# Patient Record
Sex: MALE | Race: WHITE | NOT HISPANIC OR LATINO | Employment: FULL TIME | URBAN - METROPOLITAN AREA
[De-identification: names, ages, dates, MRNs, and addresses within clinical notes are randomized per-mention and may not be internally consistent; named-entity substitution may affect disease eponyms.]

---

## 2019-03-11 ENCOUNTER — OFFICE VISIT (OUTPATIENT)
Dept: URGENT CARE | Facility: CLINIC | Age: 24
End: 2019-03-11
Payer: MEDICAID

## 2019-03-11 VITALS
BODY MASS INDEX: 25.77 KG/M2 | HEIGHT: 70 IN | TEMPERATURE: 98 F | OXYGEN SATURATION: 98 % | SYSTOLIC BLOOD PRESSURE: 123 MMHG | DIASTOLIC BLOOD PRESSURE: 70 MMHG | RESPIRATION RATE: 16 BRPM | WEIGHT: 180 LBS | HEART RATE: 72 BPM

## 2019-03-11 DIAGNOSIS — S91.131A PUNCTURE WOUND OF GREAT TOE OF RIGHT FOOT, INITIAL ENCOUNTER: Primary | ICD-10-CM

## 2019-03-11 PROCEDURE — 99203 OFFICE O/P NEW LOW 30 MIN: CPT | Mod: S$GLB,,, | Performed by: FAMILY MEDICINE

## 2019-03-11 PROCEDURE — 99203 PR OFFICE/OUTPT VISIT, NEW, LEVL III, 30-44 MIN: ICD-10-PCS | Mod: S$GLB,,, | Performed by: FAMILY MEDICINE

## 2019-03-11 RX ORDER — CIPROFLOXACIN 250 MG/1
250 TABLET, FILM COATED ORAL 2 TIMES DAILY
Qty: 6 TABLET | Refills: 0 | Status: SHIPPED | OUTPATIENT
Start: 2019-03-11 | End: 2019-03-14

## 2019-03-11 NOTE — PATIENT INSTRUCTIONS
Puncture Wound: Foot       A puncture wound occurs when a pointed object (such as a nail) pushes into the skin. It may go into the tissues below the skin of the foot, including fat and muscle. This type of wound is narrow and deep. They can be hard to clean. Puncture wounds are at high risk for becoming infected. One type of serious infection is more likely if you were wearing a rubber-soled shoe at the time of injury. Bacteria from the sole of the shoe may be dragged into the wound. Symptoms of infection may appear as late as 2 to 3 weeks after the injury. Be sure to watch for symptoms of infection and call your healthcare provider right away if any them appear.  X-rays may be done to see whether any objects remain under the skin. Your may also need a tetanus shot. This is given if you are not up to date on this vaccination and the object that caused the wound may lead to tetanus.  Puncture wounds can easily become infected.   Home care  · When you sit or lie down, raise the foot above the level of your heart. This helps reduce swelling and pain.  · Do not put weight on the injured foot if it hurts to do so or if you were told to keep weight off the injury.  · Your healthcare provider may prescribe an antibiotic. This is to help prevent infection. Follow all instructions for taking this medicine. Take the medicine every day until it is gone or you are told to stop. You should not have any left over.  · The healthcare provider may prescribe medicines for pain. Follow instructions for taking them.  · You can take acetaminophen or ibuprofen for pain, unless you were given a different pain medicine to use.   · Follow the healthcare providers instructions on how to care for the wound.  · Keep the wound clean and dry. Do not get the wound wet until you are told it is okay to do so. If the area gets wet, gently pat it dry with a clean cloth. Replace the wet bandage with a dry one.  · If a bandage was applied and it  becomes wet or dirty, replace it. Otherwise, leave it in place for the first 24 hours.  · Once you can get the wound wet, you may shower as usual but do not soak the wound in water (no tub baths or swimming)  · Check the wound daily for symptoms of infection. These include:  ¨ Increasing redness or swelling around the wound  ¨ Increased warmth of the wound  ¨ Worsening pain  ¨ Red streaking lines away from the wound  ¨ Draining pus  Follow-up care  Follow up with your healthcare provider as advised.   When to seek medical advice  Call your healthcare provider right away if any of these occur:  · Any symptoms of infection (listed above)  · Fever of 100.4°F (38.ºC) or higher, or as directed by your healthcare provider  · Wound changes colors  · Numbness around the wound  · Decreased movement around the injured area  Date Last Reviewed: 8/24/2015  © 8486-4320 [employer]. [address] [place] [address], [place] [address]. All rights reserved. This information is not intended as a substitute for professional medical care. Always follow your healthcare professional's instructions.

## 2019-03-11 NOTE — PROGRESS NOTES
"Subjective:       Patient ID: Emeka Mendoza is a 23 y.o. male.    Vitals:  height is 5' 10" (1.778 m) and weight is 81.6 kg (180 lb). His temperature is 98 °F (36.7 °C). His blood pressure is 123/70 and his pulse is 72. His respiration is 16 and oxygen saturation is 98%.     Chief Complaint: Foot Injury (right foot)    Right foot great toe puncture wound from nail 3 pm today , unsure of tetanus.       Foot Injury    The incident occurred 1 to 3 hours ago. There was no injury mechanism. Pertinent negatives include no loss of sensation, numbness or tingling.       Constitution: Negative for chills and fever.   HENT: Negative for facial swelling and sore throat.    Neck: Negative for painful lymph nodes.   Eyes: Negative for eye itching and eyelid swelling.   Respiratory: Negative for cough.    Musculoskeletal: Positive for pain and pain with walking. Negative for joint pain and joint swelling.   Skin: Negative for color change, pale, rash, wound, abrasion, laceration, lesion, skin thickening/induration, puncture wound, erythema, abscess, avulsion and hives.   Allergic/Immunologic: Negative for environmental allergies, immunocompromised state and hives.   Neurological: Negative for numbness.   Hematologic/Lymphatic: Negative for swollen lymph nodes.       Objective:      Physical Exam   Constitutional: He is oriented to person, place, and time. He appears well-developed and well-nourished. He is cooperative.  Non-toxic appearance. He does not appear ill. No distress.   HENT:   Head: Normocephalic and atraumatic.   Right Ear: Hearing, tympanic membrane, external ear and ear canal normal.   Left Ear: Hearing, tympanic membrane, external ear and ear canal normal.   Nose: Nose normal. No mucosal edema, rhinorrhea or nasal deformity. No epistaxis. Right sinus exhibits no maxillary sinus tenderness and no frontal sinus tenderness. Left sinus exhibits no maxillary sinus tenderness and no frontal sinus tenderness. "   Mouth/Throat: Uvula is midline, oropharynx is clear and moist and mucous membranes are normal. No trismus in the jaw. Normal dentition. No uvula swelling. No posterior oropharyngeal erythema.   Eyes: Conjunctivae and lids are normal. Right eye exhibits no discharge. Left eye exhibits no discharge. No scleral icterus.   Sclera clear bilat   Neck: Trachea normal, normal range of motion, full passive range of motion without pain and phonation normal. Neck supple.   Cardiovascular: Normal rate, regular rhythm, normal heart sounds, intact distal pulses and normal pulses.   Pulmonary/Chest: Effort normal and breath sounds normal. No respiratory distress.   Abdominal: Soft. Normal appearance and bowel sounds are normal. He exhibits no distension, no pulsatile midline mass and no mass. There is no tenderness.   Musculoskeletal: Normal range of motion. He exhibits no edema or deformity.   Neurological: He is alert and oriented to person, place, and time. He exhibits normal muscle tone. Coordination normal.   Skin: Skin is warm, dry and intact. He is not diaphoretic. No erythema. No pallor.   Small puncture wound 1 no surrounding erythema induration or evidence of foreign body noted at the plantar base of the right great toe.  This is minimally tender to palpation no palpitation tenderness along the dorsum or sides of the toe.  Normal range of motion without increased pain.   Psychiatric: He has a normal mood and affect. His speech is normal and behavior is normal. Judgment and thought content normal. Cognition and memory are normal.   Nursing note and vitals reviewed.      Assessment:       1. Puncture wound of great toe of right foot, initial encounter        Plan:         Puncture wound of great toe of right foot, initial encounter    Other orders  -     ciprofloxacin HCl (CIPRO) 250 MG tablet; Take 1 tablet (250 mg total) by mouth 2 (two) times daily. for 3 days  Dispense: 6 tablet; Refill: 0     Tetanus prophylaxis is  current based on college attendance records.

## 2023-11-08 ENCOUNTER — OFFICE VISIT (OUTPATIENT)
Dept: INTERNAL MEDICINE | Facility: CLINIC | Age: 28
End: 2023-11-08
Payer: COMMERCIAL

## 2023-11-08 ENCOUNTER — LAB VISIT (OUTPATIENT)
Dept: LAB | Facility: HOSPITAL | Age: 28
End: 2023-11-08
Attending: FAMILY MEDICINE
Payer: COMMERCIAL

## 2023-11-08 VITALS
OXYGEN SATURATION: 100 % | SYSTOLIC BLOOD PRESSURE: 110 MMHG | HEART RATE: 66 BPM | WEIGHT: 186.31 LBS | TEMPERATURE: 97 F | BODY MASS INDEX: 26.67 KG/M2 | HEIGHT: 70 IN | RESPIRATION RATE: 20 BRPM | DIASTOLIC BLOOD PRESSURE: 70 MMHG

## 2023-11-08 DIAGNOSIS — Z83.719 FAMILY HISTORY OF COLON POLYPS, UNSPECIFIED: ICD-10-CM

## 2023-11-08 DIAGNOSIS — Z00.00 ROUTINE ADULT HEALTH MAINTENANCE: ICD-10-CM

## 2023-11-08 DIAGNOSIS — Z00.00 ROUTINE ADULT HEALTH MAINTENANCE: Primary | ICD-10-CM

## 2023-11-08 LAB
ALBUMIN SERPL BCP-MCNC: 4.4 G/DL (ref 3.5–5.2)
ALP SERPL-CCNC: 73 U/L (ref 55–135)
ALT SERPL W/O P-5'-P-CCNC: 12 U/L (ref 10–44)
ANION GAP SERPL CALC-SCNC: 10 MMOL/L (ref 8–16)
AST SERPL-CCNC: 18 U/L (ref 10–40)
BASOPHILS # BLD AUTO: 0.06 K/UL (ref 0–0.2)
BASOPHILS NFR BLD: 1.7 % (ref 0–1.9)
BILIRUB SERPL-MCNC: 1.3 MG/DL (ref 0.1–1)
BUN SERPL-MCNC: 12 MG/DL (ref 6–20)
CALCIUM SERPL-MCNC: 9.5 MG/DL (ref 8.7–10.5)
CHLORIDE SERPL-SCNC: 107 MMOL/L (ref 95–110)
CHOLEST SERPL-MCNC: 199 MG/DL (ref 120–199)
CHOLEST/HDLC SERPL: 4.7 {RATIO} (ref 2–5)
CO2 SERPL-SCNC: 24 MMOL/L (ref 23–29)
CREAT SERPL-MCNC: 1 MG/DL (ref 0.5–1.4)
DIFFERENTIAL METHOD: ABNORMAL
EOSINOPHIL # BLD AUTO: 0.1 K/UL (ref 0–0.5)
EOSINOPHIL NFR BLD: 1.7 % (ref 0–8)
ERYTHROCYTE [DISTWIDTH] IN BLOOD BY AUTOMATED COUNT: 11.6 % (ref 11.5–14.5)
EST. GFR  (NO RACE VARIABLE): >60 ML/MIN/1.73 M^2
GLUCOSE SERPL-MCNC: 114 MG/DL (ref 70–110)
HCT VFR BLD AUTO: 39 % (ref 40–54)
HDLC SERPL-MCNC: 42 MG/DL (ref 40–75)
HDLC SERPL: 21.1 % (ref 20–50)
HGB BLD-MCNC: 13.6 G/DL (ref 14–18)
IMM GRANULOCYTES # BLD AUTO: 0.01 K/UL (ref 0–0.04)
IMM GRANULOCYTES NFR BLD AUTO: 0.3 % (ref 0–0.5)
LDLC SERPL CALC-MCNC: 142 MG/DL (ref 63–159)
LYMPHOCYTES # BLD AUTO: 1.5 K/UL (ref 1–4.8)
LYMPHOCYTES NFR BLD: 40.8 % (ref 18–48)
MCH RBC QN AUTO: 30.8 PG (ref 27–31)
MCHC RBC AUTO-ENTMCNC: 34.9 G/DL (ref 32–36)
MCV RBC AUTO: 88 FL (ref 82–98)
MONOCYTES # BLD AUTO: 0.4 K/UL (ref 0.3–1)
MONOCYTES NFR BLD: 11.1 % (ref 4–15)
NEUTROPHILS # BLD AUTO: 1.6 K/UL (ref 1.8–7.7)
NEUTROPHILS NFR BLD: 44.4 % (ref 38–73)
NONHDLC SERPL-MCNC: 157 MG/DL
NRBC BLD-RTO: 0 /100 WBC
PLATELET # BLD AUTO: 268 K/UL (ref 150–450)
PMV BLD AUTO: 11 FL (ref 9.2–12.9)
POTASSIUM SERPL-SCNC: 4.1 MMOL/L (ref 3.5–5.1)
PROT SERPL-MCNC: 7.2 G/DL (ref 6–8.4)
RBC # BLD AUTO: 4.42 M/UL (ref 4.6–6.2)
SODIUM SERPL-SCNC: 141 MMOL/L (ref 136–145)
TRIGL SERPL-MCNC: 75 MG/DL (ref 30–150)
WBC # BLD AUTO: 3.6 K/UL (ref 3.9–12.7)

## 2023-11-08 PROCEDURE — 90715 TDAP VACCINE GREATER THAN OR EQUAL TO 7YO IM: ICD-10-PCS | Mod: S$GLB,,, | Performed by: FAMILY MEDICINE

## 2023-11-08 PROCEDURE — 99999 PR PBB SHADOW E&M-NEW PATIENT-LVL III: ICD-10-PCS | Mod: PBBFAC,,, | Performed by: FAMILY MEDICINE

## 2023-11-08 PROCEDURE — 99385 PR PREVENTIVE VISIT,NEW,18-39: ICD-10-PCS | Mod: 25,S$GLB,, | Performed by: FAMILY MEDICINE

## 2023-11-08 PROCEDURE — 99999 PR PBB SHADOW E&M-NEW PATIENT-LVL III: CPT | Mod: PBBFAC,,, | Performed by: FAMILY MEDICINE

## 2023-11-08 PROCEDURE — 36415 COLL VENOUS BLD VENIPUNCTURE: CPT | Mod: PO | Performed by: FAMILY MEDICINE

## 2023-11-08 PROCEDURE — 90715 TDAP VACCINE 7 YRS/> IM: CPT | Mod: S$GLB,,, | Performed by: FAMILY MEDICINE

## 2023-11-08 PROCEDURE — 3008F PR BODY MASS INDEX (BMI) DOCUMENTED: ICD-10-PCS | Mod: CPTII,S$GLB,, | Performed by: FAMILY MEDICINE

## 2023-11-08 PROCEDURE — 99385 PREV VISIT NEW AGE 18-39: CPT | Mod: 25,S$GLB,, | Performed by: FAMILY MEDICINE

## 2023-11-08 PROCEDURE — 3074F SYST BP LT 130 MM HG: CPT | Mod: CPTII,S$GLB,, | Performed by: FAMILY MEDICINE

## 2023-11-08 PROCEDURE — 80061 LIPID PANEL: CPT | Performed by: FAMILY MEDICINE

## 2023-11-08 PROCEDURE — 3078F PR MOST RECENT DIASTOLIC BLOOD PRESSURE < 80 MM HG: ICD-10-PCS | Mod: CPTII,S$GLB,, | Performed by: FAMILY MEDICINE

## 2023-11-08 PROCEDURE — 85025 COMPLETE CBC W/AUTO DIFF WBC: CPT | Performed by: FAMILY MEDICINE

## 2023-11-08 PROCEDURE — 3008F BODY MASS INDEX DOCD: CPT | Mod: CPTII,S$GLB,, | Performed by: FAMILY MEDICINE

## 2023-11-08 PROCEDURE — 1159F PR MEDICATION LIST DOCUMENTED IN MEDICAL RECORD: ICD-10-PCS | Mod: CPTII,S$GLB,, | Performed by: FAMILY MEDICINE

## 2023-11-08 PROCEDURE — 3078F DIAST BP <80 MM HG: CPT | Mod: CPTII,S$GLB,, | Performed by: FAMILY MEDICINE

## 2023-11-08 PROCEDURE — 90471 TDAP VACCINE GREATER THAN OR EQUAL TO 7YO IM: ICD-10-PCS | Mod: S$GLB,,, | Performed by: FAMILY MEDICINE

## 2023-11-08 PROCEDURE — 3074F PR MOST RECENT SYSTOLIC BLOOD PRESSURE < 130 MM HG: ICD-10-PCS | Mod: CPTII,S$GLB,, | Performed by: FAMILY MEDICINE

## 2023-11-08 PROCEDURE — 80053 COMPREHEN METABOLIC PANEL: CPT | Performed by: FAMILY MEDICINE

## 2023-11-08 PROCEDURE — 90471 IMMUNIZATION ADMIN: CPT | Mod: S$GLB,,, | Performed by: FAMILY MEDICINE

## 2023-11-08 PROCEDURE — 1159F MED LIST DOCD IN RCRD: CPT | Mod: CPTII,S$GLB,, | Performed by: FAMILY MEDICINE

## 2023-11-08 NOTE — PROGRESS NOTES
"  Subjective:      Patient ID: Emeka Mendoza is a 27 y.o. male.    Chief Complaint: No chief complaint on file.      Patient is here for routine annual wellness visit and to establish care.  He is generally in good health, takes no routine medications.  He reports multiple family members with colon polyps found in 30s and 40s, reports parent had to have surgery to remove one.  Requesting tetanus vaccine, he is a  and frequently cuts himself      Review of Systems   Constitutional:  Negative for activity change, appetite change and unexpected weight change.   HENT:  Negative for congestion.    Respiratory:  Negative for shortness of breath.    Cardiovascular:  Negative for leg swelling.   Gastrointestinal:  Negative for abdominal pain.   Musculoskeletal:  Negative for arthralgias.   Skin:  Negative for wound.   Psychiatric/Behavioral:  Negative for sleep disturbance.      Past Medical History:   Diagnosis Date    Anxiety           Past Surgical History:   Procedure Laterality Date    FRACTURE SURGERY       Family History   Problem Relation Age of Onset    No Known Problems Mother     No Known Problems Father      Social History     Socioeconomic History    Marital status: Single   Tobacco Use    Smoking status: Never    Smokeless tobacco: Never     Review of patient's allergies indicates:  No Known Allergies    Objective:       /70 (BP Location: Left arm, Patient Position: Sitting, BP Method: Large (Manual))   Pulse 66   Temp 96.9 °F (36.1 °C) (Tympanic)   Resp 20   Ht 5' 10.25" (1.784 m)   Wt 84.5 kg (186 lb 4.6 oz)   SpO2 100%   BMI 26.54 kg/m²   Physical Exam  Vitals reviewed.   Constitutional:       General: He is not in acute distress.     Appearance: Normal appearance. He is well-developed. He is not ill-appearing or diaphoretic.   HENT:      Head: Normocephalic.      Right Ear: Hearing, tympanic membrane, ear canal and external ear normal.      Left Ear: Hearing, tympanic membrane, ear " canal and external ear normal.      Nose: Nose normal.      Right Sinus: No maxillary sinus tenderness or frontal sinus tenderness.      Left Sinus: No maxillary sinus tenderness or frontal sinus tenderness.      Mouth/Throat:      Pharynx: Uvula midline. No oropharyngeal exudate.   Eyes:      Conjunctiva/sclera: Conjunctivae normal.      Pupils: Pupils are equal, round, and reactive to light.   Cardiovascular:      Rate and Rhythm: Normal rate and regular rhythm.   Pulmonary:      Effort: Pulmonary effort is normal. No respiratory distress.      Breath sounds: Normal breath sounds.   Abdominal:      General: Bowel sounds are normal.      Palpations: Abdomen is soft.      Tenderness: There is no abdominal tenderness. There is no guarding.      Hernia: No hernia is present.   Musculoskeletal:         General: Normal range of motion.      Cervical back: Normal range of motion and neck supple.      Right lower leg: No edema.      Left lower leg: No edema.   Skin:     General: Skin is warm and dry.      Capillary Refill: Capillary refill takes less than 2 seconds.   Neurological:      General: No focal deficit present.      Mental Status: He is alert and oriented to person, place, and time.   Psychiatric:         Mood and Affect: Mood normal.         Behavior: Behavior normal.         Thought Content: Thought content normal.         Judgment: Judgment normal.       Assessment:     1. Routine adult health maintenance    2. Family history of colon polyps, unspecified      Plan:   Routine adult health maintenance  -     Comprehensive Metabolic Panel; Future; Expected date: 11/08/2023  -     CBC Auto Differential; Future; Expected date: 11/08/2023  -     Lipid Panel; Future; Expected date: 11/08/2023    Family history of colon polyps, unspecified    Other orders  -     (In Office Administered) Tdap Vaccine

## 2023-11-09 ENCOUNTER — TELEPHONE (OUTPATIENT)
Dept: INTERNAL MEDICINE | Facility: CLINIC | Age: 28
End: 2023-11-09
Payer: COMMERCIAL

## 2023-11-14 ENCOUNTER — LAB VISIT (OUTPATIENT)
Dept: LAB | Facility: HOSPITAL | Age: 28
End: 2023-11-14
Attending: FAMILY MEDICINE
Payer: COMMERCIAL

## 2023-11-14 ENCOUNTER — OFFICE VISIT (OUTPATIENT)
Dept: INTERNAL MEDICINE | Facility: CLINIC | Age: 28
End: 2023-11-14
Payer: COMMERCIAL

## 2023-11-14 VITALS
BODY MASS INDEX: 26.92 KG/M2 | WEIGHT: 188.06 LBS | TEMPERATURE: 97 F | HEART RATE: 79 BPM | DIASTOLIC BLOOD PRESSURE: 76 MMHG | HEIGHT: 70 IN | OXYGEN SATURATION: 99 % | SYSTOLIC BLOOD PRESSURE: 126 MMHG

## 2023-11-14 DIAGNOSIS — D75.1 POLYCYTHEMIA: ICD-10-CM

## 2023-11-14 DIAGNOSIS — R73.9 HYPERGLYCEMIA: Primary | ICD-10-CM

## 2023-11-14 DIAGNOSIS — R73.9 HYPERGLYCEMIA: ICD-10-CM

## 2023-11-14 LAB
BASOPHILS # BLD AUTO: 0.07 K/UL (ref 0–0.2)
BASOPHILS NFR BLD: 1.4 % (ref 0–1.9)
DIFFERENTIAL METHOD: ABNORMAL
EOSINOPHIL # BLD AUTO: 0.2 K/UL (ref 0–0.5)
EOSINOPHIL NFR BLD: 3.1 % (ref 0–8)
ERYTHROCYTE [DISTWIDTH] IN BLOOD BY AUTOMATED COUNT: 11.9 % (ref 11.5–14.5)
ESTIMATED AVG GLUCOSE: 108 MG/DL (ref 68–131)
HBA1C MFR BLD: 5.4 % (ref 4–5.6)
HCT VFR BLD AUTO: 41.7 % (ref 40–54)
HGB BLD-MCNC: 14.8 G/DL (ref 14–18)
IMM GRANULOCYTES # BLD AUTO: 0.01 K/UL (ref 0–0.04)
IMM GRANULOCYTES NFR BLD AUTO: 0.2 % (ref 0–0.5)
LYMPHOCYTES # BLD AUTO: 1.7 K/UL (ref 1–4.8)
LYMPHOCYTES NFR BLD: 33.7 % (ref 18–48)
MCH RBC QN AUTO: 31.8 PG (ref 27–31)
MCHC RBC AUTO-ENTMCNC: 35.5 G/DL (ref 32–36)
MCV RBC AUTO: 90 FL (ref 82–98)
MONOCYTES # BLD AUTO: 0.5 K/UL (ref 0.3–1)
MONOCYTES NFR BLD: 9.2 % (ref 4–15)
NEUTROPHILS # BLD AUTO: 2.7 K/UL (ref 1.8–7.7)
NEUTROPHILS NFR BLD: 52.4 % (ref 38–73)
NRBC BLD-RTO: 0 /100 WBC
PLATELET # BLD AUTO: 292 K/UL (ref 150–450)
PMV BLD AUTO: 10.4 FL (ref 9.2–12.9)
RBC # BLD AUTO: 4.66 M/UL (ref 4.6–6.2)
WBC # BLD AUTO: 5.1 K/UL (ref 3.9–12.7)

## 2023-11-14 PROCEDURE — 3078F DIAST BP <80 MM HG: CPT | Mod: CPTII,S$GLB,, | Performed by: FAMILY MEDICINE

## 2023-11-14 PROCEDURE — 3008F BODY MASS INDEX DOCD: CPT | Mod: CPTII,S$GLB,, | Performed by: FAMILY MEDICINE

## 2023-11-14 PROCEDURE — 99999 PR PBB SHADOW E&M-EST. PATIENT-LVL III: CPT | Mod: PBBFAC,,, | Performed by: FAMILY MEDICINE

## 2023-11-14 PROCEDURE — 99213 OFFICE O/P EST LOW 20 MIN: CPT | Mod: S$GLB,,, | Performed by: FAMILY MEDICINE

## 2023-11-14 PROCEDURE — 3074F PR MOST RECENT SYSTOLIC BLOOD PRESSURE < 130 MM HG: ICD-10-PCS | Mod: CPTII,S$GLB,, | Performed by: FAMILY MEDICINE

## 2023-11-14 PROCEDURE — 3008F PR BODY MASS INDEX (BMI) DOCUMENTED: ICD-10-PCS | Mod: CPTII,S$GLB,, | Performed by: FAMILY MEDICINE

## 2023-11-14 PROCEDURE — 99213 PR OFFICE/OUTPT VISIT, EST, LEVL III, 20-29 MIN: ICD-10-PCS | Mod: S$GLB,,, | Performed by: FAMILY MEDICINE

## 2023-11-14 PROCEDURE — 3074F SYST BP LT 130 MM HG: CPT | Mod: CPTII,S$GLB,, | Performed by: FAMILY MEDICINE

## 2023-11-14 PROCEDURE — 83036 HEMOGLOBIN GLYCOSYLATED A1C: CPT | Performed by: FAMILY MEDICINE

## 2023-11-14 PROCEDURE — 85025 COMPLETE CBC W/AUTO DIFF WBC: CPT | Performed by: FAMILY MEDICINE

## 2023-11-14 PROCEDURE — 1159F PR MEDICATION LIST DOCUMENTED IN MEDICAL RECORD: ICD-10-PCS | Mod: CPTII,S$GLB,, | Performed by: FAMILY MEDICINE

## 2023-11-14 PROCEDURE — 99999 PR PBB SHADOW E&M-EST. PATIENT-LVL III: ICD-10-PCS | Mod: PBBFAC,,, | Performed by: FAMILY MEDICINE

## 2023-11-14 PROCEDURE — 3078F PR MOST RECENT DIASTOLIC BLOOD PRESSURE < 80 MM HG: ICD-10-PCS | Mod: CPTII,S$GLB,, | Performed by: FAMILY MEDICINE

## 2023-11-14 PROCEDURE — 36415 COLL VENOUS BLD VENIPUNCTURE: CPT | Mod: PO | Performed by: FAMILY MEDICINE

## 2023-11-14 PROCEDURE — 1159F MED LIST DOCD IN RCRD: CPT | Mod: CPTII,S$GLB,, | Performed by: FAMILY MEDICINE

## 2023-11-14 NOTE — PROGRESS NOTES
"Subjective:      Patient ID: Emeka Mendoza is a 27 y.o. male.    Chief Complaint: Follow-up      The patient is here today for routine follow up. Labs drawn earlier discussed today with the patient.  On CBC his RBC, WBC both decreased.  No previous labs to compare, he denies any recent illnesses.  Fasting glucose mildly increased  Other labs are normal range  No new problems today    Follow-up  Pertinent negatives include no abdominal pain, nausea or vomiting.     Review of Systems   Constitutional:  Negative for activity change and appetite change.   Gastrointestinal:  Negative for abdominal pain, constipation, diarrhea, nausea and vomiting.     Past Medical History:   Diagnosis Date    Anxiety           Past Surgical History:   Procedure Laterality Date    FRACTURE SURGERY       Family History   Problem Relation Age of Onset    No Known Problems Mother     No Known Problems Father      Social History     Socioeconomic History    Marital status: Single   Tobacco Use    Smoking status: Never    Smokeless tobacco: Never     Review of patient's allergies indicates:  No Known Allergies    Objective:       /76 (BP Location: Left arm, Patient Position: Sitting, BP Method: Large (Manual))   Pulse 79   Temp 97.3 °F (36.3 °C) (Tympanic)   Ht 5' 10" (1.778 m)   Wt 85.3 kg (188 lb 0.8 oz)   SpO2 99%   BMI 26.98 kg/m²   Physical Exam  Constitutional:       General: He is not in acute distress.     Appearance: Normal appearance. He is well-developed. He is not ill-appearing or diaphoretic.   Neurological:      General: No focal deficit present.      Mental Status: He is alert and oriented to person, place, and time.   Psychiatric:         Mood and Affect: Mood normal.         Behavior: Behavior normal.         Thought Content: Thought content normal.         Judgment: Judgment normal.       Assessment:     1. Hyperglycemia    2. Polycythemia      Plan:   Hyperglycemia  -     Hemoglobin A1C; Future; Expected date: " 05/12/2024  -     CBC Auto Differential; Future; Expected date: 11/14/2023    Polycythemia  -     Hemoglobin A1C; Future; Expected date: 05/12/2024  -     CBC Auto Differential; Future; Expected date: 11/14/2023    Will check above labs today  Consider hematology referral if CBC still abnormal

## 2023-11-15 ENCOUNTER — TELEPHONE (OUTPATIENT)
Dept: INTERNAL MEDICINE | Facility: CLINIC | Age: 28
End: 2023-11-15
Payer: COMMERCIAL

## 2023-11-15 NOTE — TELEPHONE ENCOUNTER
----- Message from Prisca Ferro MD sent at 11/15/2023  8:00 AM CST -----  Notify his blood count is back to normal.  Also his A1c which is an average of his sugar is also normal

## 2025-06-13 ENCOUNTER — PATIENT OUTREACH (OUTPATIENT)
Dept: ADMINISTRATIVE | Facility: HOSPITAL | Age: 30
End: 2025-06-13
Payer: COMMERCIAL

## 2025-07-09 ENCOUNTER — LAB VISIT (OUTPATIENT)
Dept: LAB | Facility: HOSPITAL | Age: 30
End: 2025-07-09
Attending: FAMILY MEDICINE
Payer: COMMERCIAL

## 2025-07-09 ENCOUNTER — OFFICE VISIT (OUTPATIENT)
Dept: INTERNAL MEDICINE | Facility: CLINIC | Age: 30
End: 2025-07-09
Payer: COMMERCIAL

## 2025-07-09 VITALS
HEIGHT: 70 IN | WEIGHT: 193.56 LBS | TEMPERATURE: 97 F | DIASTOLIC BLOOD PRESSURE: 78 MMHG | HEART RATE: 65 BPM | BODY MASS INDEX: 27.71 KG/M2 | SYSTOLIC BLOOD PRESSURE: 108 MMHG | OXYGEN SATURATION: 99 %

## 2025-07-09 DIAGNOSIS — Z83.719 FAMILY HISTORY OF COLON POLYPS, UNSPECIFIED: ICD-10-CM

## 2025-07-09 DIAGNOSIS — K62.5 RECTAL BLEED: ICD-10-CM

## 2025-07-09 DIAGNOSIS — Z00.00 ROUTINE ADULT HEALTH MAINTENANCE: Primary | ICD-10-CM

## 2025-07-09 DIAGNOSIS — Z00.00 ROUTINE ADULT HEALTH MAINTENANCE: ICD-10-CM

## 2025-07-09 LAB
ABSOLUTE EOSINOPHIL (OHS): 0.09 K/UL
ABSOLUTE MONOCYTE (OHS): 0.43 K/UL (ref 0.3–1)
ABSOLUTE NEUTROPHIL COUNT (OHS): 2.25 K/UL (ref 1.8–7.7)
ALBUMIN SERPL BCP-MCNC: 4.7 G/DL (ref 3.5–5.2)
ALP SERPL-CCNC: 78 UNIT/L (ref 40–150)
ALT SERPL W/O P-5'-P-CCNC: 19 UNIT/L (ref 10–44)
ANION GAP (OHS): 9 MMOL/L (ref 8–16)
AST SERPL-CCNC: 19 UNIT/L (ref 11–45)
BASOPHILS # BLD AUTO: 0.07 K/UL
BASOPHILS NFR BLD AUTO: 1.6 %
BILIRUB SERPL-MCNC: 1.2 MG/DL (ref 0.1–1)
BUN SERPL-MCNC: 16 MG/DL (ref 6–20)
CALCIUM SERPL-MCNC: 9.2 MG/DL (ref 8.7–10.5)
CHLORIDE SERPL-SCNC: 104 MMOL/L (ref 95–110)
CHOLEST SERPL-MCNC: 214 MG/DL (ref 120–199)
CHOLEST/HDLC SERPL: 5.1 {RATIO} (ref 2–5)
CO2 SERPL-SCNC: 25 MMOL/L (ref 23–29)
CREAT SERPL-MCNC: 1 MG/DL (ref 0.5–1.4)
EAG (OHS): 103 MG/DL (ref 68–131)
ERYTHROCYTE [DISTWIDTH] IN BLOOD BY AUTOMATED COUNT: 11.8 % (ref 11.5–14.5)
GFR SERPLBLD CREATININE-BSD FMLA CKD-EPI: >60 ML/MIN/1.73/M2
GLUCOSE SERPL-MCNC: 99 MG/DL (ref 70–110)
HBA1C MFR BLD: 5.2 % (ref 4–5.6)
HCT VFR BLD AUTO: 44 % (ref 40–54)
HDLC SERPL-MCNC: 42 MG/DL (ref 40–75)
HDLC SERPL: 19.6 % (ref 20–50)
HGB BLD-MCNC: 14.9 GM/DL (ref 14–18)
IMM GRANULOCYTES # BLD AUTO: 0.01 K/UL (ref 0–0.04)
IMM GRANULOCYTES NFR BLD AUTO: 0.2 % (ref 0–0.5)
LDLC SERPL CALC-MCNC: 152.6 MG/DL (ref 63–159)
LYMPHOCYTES # BLD AUTO: 1.65 K/UL (ref 1–4.8)
MCH RBC QN AUTO: 30 PG (ref 27–31)
MCHC RBC AUTO-ENTMCNC: 33.9 G/DL (ref 32–36)
MCV RBC AUTO: 89 FL (ref 82–98)
NONHDLC SERPL-MCNC: 172 MG/DL
NUCLEATED RBC (/100WBC) (OHS): 0 /100 WBC
PLATELET # BLD AUTO: 270 K/UL (ref 150–450)
PMV BLD AUTO: 10.7 FL (ref 9.2–12.9)
POTASSIUM SERPL-SCNC: 4.3 MMOL/L (ref 3.5–5.1)
PROT SERPL-MCNC: 7.6 GM/DL (ref 6–8.4)
RBC # BLD AUTO: 4.96 M/UL (ref 4.6–6.2)
RELATIVE EOSINOPHIL (OHS): 2 %
RELATIVE LYMPHOCYTE (OHS): 36.7 % (ref 18–48)
RELATIVE MONOCYTE (OHS): 9.6 % (ref 4–15)
RELATIVE NEUTROPHIL (OHS): 49.9 % (ref 38–73)
SODIUM SERPL-SCNC: 138 MMOL/L (ref 136–145)
TRIGL SERPL-MCNC: 97 MG/DL (ref 30–150)
WBC # BLD AUTO: 4.5 K/UL (ref 3.9–12.7)

## 2025-07-09 PROCEDURE — 85025 COMPLETE CBC W/AUTO DIFF WBC: CPT

## 2025-07-09 PROCEDURE — 3078F DIAST BP <80 MM HG: CPT | Mod: CPTII,S$GLB,, | Performed by: FAMILY MEDICINE

## 2025-07-09 PROCEDURE — 3074F SYST BP LT 130 MM HG: CPT | Mod: CPTII,S$GLB,, | Performed by: FAMILY MEDICINE

## 2025-07-09 PROCEDURE — 80053 COMPREHEN METABOLIC PANEL: CPT

## 2025-07-09 PROCEDURE — 83036 HEMOGLOBIN GLYCOSYLATED A1C: CPT

## 2025-07-09 PROCEDURE — 99395 PREV VISIT EST AGE 18-39: CPT | Mod: S$GLB,,, | Performed by: FAMILY MEDICINE

## 2025-07-09 PROCEDURE — 36415 COLL VENOUS BLD VENIPUNCTURE: CPT | Mod: PO

## 2025-07-09 PROCEDURE — 3008F BODY MASS INDEX DOCD: CPT | Mod: CPTII,S$GLB,, | Performed by: FAMILY MEDICINE

## 2025-07-09 PROCEDURE — 99999 PR PBB SHADOW E&M-EST. PATIENT-LVL IV: CPT | Mod: PBBFAC,,, | Performed by: FAMILY MEDICINE

## 2025-07-09 PROCEDURE — 80061 LIPID PANEL: CPT

## 2025-07-09 PROCEDURE — 1159F MED LIST DOCD IN RCRD: CPT | Mod: CPTII,S$GLB,, | Performed by: FAMILY MEDICINE

## 2025-07-09 NOTE — PROGRESS NOTES
Subjective:      Patient ID: Emeka Mendoza is a 29 y.o. male.    Chief Complaint: Annual Exam      History of Present Illness    CHIEF COMPLAINT:  Mr. Mendoza presents today for for routine annual wellness exam.  Overall feeling well.    RECTAL SYMPTOMS:  He reports intermittent minimal rectal bleeding when wiping and intermittent rectal itching. He experiences occasional small amounts of blood with bowel movements. He denies rectal pain or discomfort with sitting and denies severe abdominal pain.    ANXIETY:  He experiences frequent anxiety at work, particularly in business-related settings, with occasional chest tightness associated with stress that he describes as not notable pain. He perceives his anxiety as tolerable and manageable, stating it feels normal and is something he and his wife navigate together in their professional lives. He denies significant functional impairment from anxiety and does not currently seek medication management.    FAMILY HISTORY:  Family history is significant for colon polyps in his father, who required polyp removal at approximately age 30. He acknowledges the potential importance of screening and desires to undergo colonoscopy pending insurance coverage.        Past Medical History:   Diagnosis Date    Anxiety           Past Surgical History:   Procedure Laterality Date    FRACTURE SURGERY      VASECTOMY      01/16/2025     Family History   Problem Relation Name Age of Onset    No Known Problems Mother      No Known Problems Father       Social History[1]  Review of patient's allergies indicates:  No Known Allergies    Review of Systems   Constitutional:  Negative for chills, fever and malaise/fatigue.   HENT:  Negative for congestion.    Respiratory:  Negative for cough and shortness of breath.    Cardiovascular:  Negative for chest pain and palpitations.   Gastrointestinal:  Positive for blood in stool. Negative for abdominal pain.   Musculoskeletal:  Negative for myalgias.  "  Skin:  Negative for rash.   Psychiatric/Behavioral:  Negative for depression. The patient is nervous/anxious. The patient does not have insomnia.      Objective:       /78 (BP Location: Right arm, Patient Position: Sitting)   Pulse 65   Temp 97.1 °F (36.2 °C) (Tympanic)   Ht 5' 10" (1.778 m)   Wt 87.8 kg (193 lb 9 oz)   SpO2 99%   BMI 27.77 kg/m²   Physical Exam    Lungs: All normal.        Physical Exam  Vitals reviewed.   Constitutional:       General: He is not in acute distress.     Appearance: Normal appearance. He is well-developed. He is not ill-appearing or diaphoretic.   HENT:      Head: Normocephalic.      Right Ear: Hearing, tympanic membrane, ear canal and external ear normal.      Left Ear: Hearing, tympanic membrane, ear canal and external ear normal.      Nose: Nose normal.      Right Sinus: No maxillary sinus tenderness or frontal sinus tenderness.      Left Sinus: No maxillary sinus tenderness or frontal sinus tenderness.      Mouth/Throat:      Pharynx: Uvula midline. No oropharyngeal exudate.   Eyes:      Conjunctiva/sclera: Conjunctivae normal.      Pupils: Pupils are equal, round, and reactive to light.   Cardiovascular:      Rate and Rhythm: Normal rate and regular rhythm.   Pulmonary:      Effort: Pulmonary effort is normal. No respiratory distress.      Breath sounds: Normal breath sounds.   Abdominal:      General: Bowel sounds are normal.      Palpations: Abdomen is soft.      Tenderness: There is no abdominal tenderness. There is no guarding.      Hernia: No hernia is present.   Musculoskeletal:         General: Normal range of motion.      Cervical back: Normal range of motion and neck supple.      Right lower leg: No edema.      Left lower leg: No edema.   Skin:     General: Skin is warm and dry.      Capillary Refill: Capillary refill takes less than 2 seconds.   Neurological:      General: No focal deficit present.      Mental Status: He is alert and oriented to person, " place, and time.   Psychiatric:         Mood and Affect: Mood normal.         Behavior: Behavior normal.         Thought Content: Thought content normal.         Judgment: Judgment normal.         Assessment:     1. Routine adult health maintenance    2. Family history of colon polyps, unspecified    3. Rectal bleed      Plan:   Assessment & Plan    MEDICAL DECISION MAKING:  - Anxiety tolerable and not requiring medication at this time.  - Rectal bleeding likely due to hemorrhoids, but colonoscopy warranted given family history.    PATIENT EDUCATION:  - Educated patient on increased incidence of colon cancer in [place], especially in younger populations.  - Discussed potential insurance coverage for colonoscopy based on family history and current symptoms.    ORDERS:  - Ordered labs.  - Submitted request for colonoscopy.    REFERRALS:  - Recommend colonoscopy due to family history of colon polyps and reported rectal bleeding.        Routine adult health maintenance  -     Comprehensive Metabolic Panel; Future; Expected date: 07/09/2025  -     Hemoglobin A1C; Future; Expected date: 07/09/2025  -     CBC Auto Differential; Future; Expected date: 07/09/2025  -     Lipid Panel; Future; Expected date: 07/09/2025  -     Cancel: Ambulatory referral/consult to Endo Procedure ; Future; Expected date: 07/10/2025  -     Ambulatory referral/consult to Endo Procedure ; Future; Expected date: 07/10/2025    Family history of colon polyps, unspecified  -     Cancel: Ambulatory referral/consult to Endo Procedure ; Future; Expected date: 07/10/2025  -     Ambulatory referral/consult to Endo Procedure ; Future; Expected date: 07/10/2025    Rectal bleed  -     Ambulatory referral/consult to Endo Procedure ; Future; Expected date: 07/10/2025          This note was generated with the assistance of ambient listening technology. Verbal consent was obtained by the patient and accompanying  visitor(s) for the recording of patient appointment to facilitate this note. I attest to having reviewed and edited the generated note for accuracy, though some syntax or spelling errors may persist. Please contact the author of this note for any clarification.            [1]   Social History  Socioeconomic History    Marital status: Single   Tobacco Use    Smoking status: Never    Smokeless tobacco: Never

## 2025-07-10 ENCOUNTER — HOSPITAL ENCOUNTER (OUTPATIENT)
Dept: PREADMISSION TESTING | Facility: HOSPITAL | Age: 30
Discharge: HOME | End: 2025-07-10
Attending: INTERNAL MEDICINE
Payer: COMMERCIAL

## 2025-07-10 DIAGNOSIS — K62.5 RECTAL BLEED: ICD-10-CM

## 2025-07-10 DIAGNOSIS — Z00.00 ROUTINE ADULT HEALTH MAINTENANCE: Primary | ICD-10-CM

## 2025-07-10 DIAGNOSIS — Z83.719 FAMILY HISTORY OF COLON POLYPS, UNSPECIFIED: ICD-10-CM

## 2025-07-10 RX ORDER — SODIUM, POTASSIUM,MAG SULFATES 17.5-3.13G
1 SOLUTION, RECONSTITUTED, ORAL ORAL DAILY
Qty: 1 KIT | Refills: 0 | Status: SHIPPED | OUTPATIENT
Start: 2025-07-10 | End: 2025-07-12

## 2025-08-04 ENCOUNTER — ANESTHESIA EVENT (OUTPATIENT)
Dept: ENDOSCOPY | Facility: HOSPITAL | Age: 30
End: 2025-08-04
Payer: COMMERCIAL

## 2025-08-04 ENCOUNTER — HOSPITAL ENCOUNTER (OUTPATIENT)
Dept: ENDOSCOPY | Facility: HOSPITAL | Age: 30
Discharge: HOME | End: 2025-08-04
Attending: FAMILY MEDICINE | Admitting: INTERNAL MEDICINE
Payer: COMMERCIAL

## 2025-08-04 ENCOUNTER — ANESTHESIA (OUTPATIENT)
Dept: ENDOSCOPY | Facility: HOSPITAL | Age: 30
End: 2025-08-04
Payer: COMMERCIAL

## 2025-08-04 DIAGNOSIS — K62.5 RECTAL BLEED: ICD-10-CM

## 2025-08-04 DIAGNOSIS — K62.5 RECTAL BLEEDING: Primary | ICD-10-CM

## 2025-08-04 DIAGNOSIS — Z00.00 ROUTINE ADULT HEALTH MAINTENANCE: ICD-10-CM

## 2025-08-04 DIAGNOSIS — Z83.719 FAMILY HISTORY OF COLON POLYPS, UNSPECIFIED: ICD-10-CM

## 2025-08-04 PROCEDURE — 25000003 PHARM REV CODE 250: Performed by: INTERNAL MEDICINE

## 2025-08-04 PROCEDURE — 27201012 HC FORCEPS, HOT/COLD, DISP: Performed by: INTERNAL MEDICINE

## 2025-08-04 PROCEDURE — 37000008 HC ANESTHESIA 1ST 15 MINUTES

## 2025-08-04 PROCEDURE — 63600175 PHARM REV CODE 636 W HCPCS: Performed by: NURSE ANESTHETIST, CERTIFIED REGISTERED

## 2025-08-04 PROCEDURE — 37000009 HC ANESTHESIA EA ADD 15 MINS

## 2025-08-04 PROCEDURE — 88305 TISSUE EXAM BY PATHOLOGIST: CPT | Mod: TC | Performed by: INTERNAL MEDICINE

## 2025-08-04 RX ORDER — LIDOCAINE HYDROCHLORIDE 10 MG/ML
INJECTION, SOLUTION EPIDURAL; INFILTRATION; INTRACAUDAL; PERINEURAL
Status: DISCONTINUED | OUTPATIENT
Start: 2025-08-04 | End: 2025-08-04

## 2025-08-04 RX ORDER — PROPOFOL 10 MG/ML
VIAL (ML) INTRAVENOUS
Status: DISCONTINUED | OUTPATIENT
Start: 2025-08-04 | End: 2025-08-04

## 2025-08-04 RX ORDER — DEXTROMETHORPHAN/PSEUDOEPHED 2.5-7.5/.8
DROPS ORAL
Status: COMPLETED | OUTPATIENT
Start: 2025-08-04 | End: 2025-08-04

## 2025-08-04 RX ADMIN — SIMETHICONE 200 MG: 20 SUSPENSION/ DROPS ORAL at 07:08

## 2025-08-04 RX ADMIN — PROPOFOL 50 MG: 10 INJECTION, EMULSION INTRAVENOUS at 07:08

## 2025-08-04 RX ADMIN — PROPOFOL 100 MG: 10 INJECTION, EMULSION INTRAVENOUS at 07:08

## 2025-08-04 RX ADMIN — LIDOCAINE HYDROCHLORIDE 50 MG: 10 SOLUTION INTRAVENOUS at 07:08

## 2025-08-04 NOTE — ANESTHESIA POSTPROCEDURE EVALUATION
Anesthesia Post Evaluation    Patient: Emeka Mendoza    Procedure(s) Performed: * No procedures listed *    Final Anesthesia Type: MAC      Patient location during evaluation: GI PACU  Patient participation: Yes- Able to Participate  Level of consciousness: awake and alert  Post-procedure vital signs: reviewed and stable  Pain management: adequate  Airway patency: patent    PONV status at discharge: No PONV  Anesthetic complications: no      Cardiovascular status: blood pressure returned to baseline  Respiratory status: unassisted and spontaneous ventilation  Hydration status: euvolemic  Follow-up not needed.              Vitals Value Taken Time   /72 08/04/25 08:05   Temp 36.6 °C (97.9 °F) 08/04/25 07:46   Pulse 82 08/04/25 08:05   Resp 16 08/04/25 08:05   SpO2 97 % 08/04/25 08:05         Event Time   Out of Recovery 08:09:11         Pain/Elizabeth Score: Elizabeth Score: 10 (8/4/2025  8:08 AM)

## 2025-08-04 NOTE — DISCHARGE SUMMARY
[facility] (H[facility])  Discharge Note  Short Stay    Colonoscopy      OUTCOME: Patient tolerated treatment/procedure well without complication and is now ready for discharge.    DISPOSITION: Home or Self Care    FINAL DIAGNOSIS:  Rectal bleeding    FOLLOWUP: With primary care provider    DISCHARGE INSTRUCTIONS:  No discharge procedures on file.      Clinical Reference Documents Added to Patient Instructions         Document    COLONOSCOPY (ENGLISH)    HEMORRHOIDS (ENGLISH)

## 2025-08-04 NOTE — ANESTHESIA PREPROCEDURE EVALUATION
08/04/2025  Emeka Mendoza is a 29 y.o., male.      Pre-op Assessment    I have reviewed the Patient Summary Reports.    I have reviewed the NPO Status.   I have reviewed the Medications.     Review of Systems  Anesthesia Hx:  No problems with previous Anesthesia                Psych:   anxiety               Physical Exam  General: Well nourished    Airway:  Mallampati: II   Mouth Opening: Normal  TM Distance: Normal  Tongue: Normal  Neck ROM: Normal ROM    Dental:  Intact    Chest/Lungs:  Normal Respiratory Rate    Heart:  Rate: Normal  Rhythm: Regular Rhythm    Anesthesia Plan  Type of Anesthesia, risks & benefits discussed:    Anesthesia Type: MAC  Intra-op Monitoring Plan: Standard ASA Monitors  Post Op Pain Control Plan: multimodal analgesia  Induction:  IV  Informed Consent: Informed consent signed with the Patient and all parties understand the risks and agree with anesthesia plan.  All questions answered.   ASA Score: 2  Day of Surgery Review of History & Physical: H&P Update referred to the surgeon/provider.    Ready For Surgery From Anesthesia Perspective.   .

## 2025-08-04 NOTE — LETTER
Emeka Mendoza  [address] [place] [id number]        [address] [address]    We are so sorry we missed you to discuss your *** procedure, scheduled on *** (Day), *** (Date).   Please arrive at *** and take your second bowel prep at ***.  We still need to go over your instructions and a quick medical review for anesthesia.  Please return our call to verify and review.  Feel free to reach out with any questions.    Thank you,   Endoscopy Scheduling/Pre-admit Department   [phone]          [facility] Instructions for Colonoscopy    Date of procedure:Monday 8/4/2025  Your arrival time will be 06:30AM  Your procedure will be performed at [facility] ([facility]). The hospital is located at [address] (off [facility]).     As soon as possible:     your PRESCRIPTION PREP from pharmacy and over the counter DULCOLAX LAXATIVE TABLETS (bisacodyl 5mg)    Three (3) days before colonoscopy: Avoid high fiber foods such as whole wheat or whole grain breads or pasta, raw vegetables or fruit with peels, corn, beans, peas, lentils, nuts, seeds, and popcorn.    On the day before your procedure     What You CAN do:     You may have CLEAR LIQUIDS ONLY (Drink at least 16 glasses (8oz glass)-   see below for list.   Liquids That Are OK to Drink:    Water    Sports drinks (Gatorade, Power-Aid)    Coffee or tea (no cream or nondairy creamer)    Clear juices without pulp (apple, white grape)    Gelatin desserts (no fruit or toppings)    Clear soda (sprite, coke, ginger ale)    Chicken broth (until 12 midnight the night before procedure)      What You CANNOT do:      Do not EAT solid food, drink milk or anything colored red or purple.    Do not drink alcohol.    Do not take oral medications within 1 hour of starting each dose of prep.    No tobacco products, gum chewing, or candy morning of procedure     PLEASE DISREGARD THE INSERT  INSTRUCTIONS FROM THE PHARMACY.  How to take prep:  Day Before Colonoscopy  2:00 pm Take four (4) Dulcolax (Bisacodyl) Laxative tablets with at least 8 ounces or more of clear liquids.    6:00 pm Pour one 6 oz. bottle of prep solution into the mixing container. Add cool water to reach the 16-oz. fill line.  Mix well. Drink ALL the contents in the mixing container. Drink 2 more 16 oz. containers of water. Finish the prep mixture and the 2 glasses of water within 1 hour. Drink a minimum of four (8 oz) glasses of clear liquids before midnight to stay hydrated. Mix the prep with Crystal Light (no red or purple flavoring), apple juice, or Gatorade (no red or purple) to improve the flavor.     Day of the Colonoscopy     2:00 AM- REPEAT using the second 6 oz prep solution and mixing container. Add cool water to reach the 16-oz. fill line.  Mix well. Drink ALL the contents in the mixing container. Drink 2 more 16 oz. containers of water. Finish the prep mixture and the 2 glasses of water within 1 hour.After finishing prep, do not drink or eat anything until after the colonoscopy.     You may have a small sip of water with your morning medications. If your stool is brown, orange, or cloudy, your colon is not clean, please call endoscopy staff at [phone].    Endoscopy Scheduling Department at [phone].  Endoscopy Department at [phone].   On-Call Nurse line at [phone].  Financial Services at [phone].  Frequently Asked Questions- Colonoscopy  What are some tips for preparing for a colonoscopy?  Stay near a toilet after taking the prep, you will have diarrhea and may have cramping with your bowel movements.  For skin irritation or flaring of hemorrhoids from frequent bowel movements and wiping, ease with over-the-counter products like baby wipes, Vaseline, or Tucks pads.  If experiencing nausea from the prep, take a 30-minute break, rinse your mouth, and continue drinking the prep.  Dramamine (Meclizine) is available over the counter, take ½ of a tablet (12.5 mg) every 6 hours as needed for nausea. Do not take more than 50 mg in 24 hours.   If a long drive or need a change to the prep direction times, please call the endoscopy department to talk with our staff at [phone].  Females between the ages of 10-55 may be asked for a urine sample (pregnancy test) after you check in for your procedure. Please let staff know before going to the restroom.  Coumadin (WARFARIN), Plavix (CLOPIDOGREL), and Effient (PRASUGREL) MUST be stopped 5 days prior to exam unless discussed with the doctor performing the test. Eliquis (APIXABAN), Savaysa (EDOXABAN), Arixtra (FONDAPARINUX), and Xarelto (RIVAROXABAN) MUST be stopped 2 days prior to exam unless discussed with the doctor performing the test.  Glucagon-like peptide-1 receptor agonists (GLP-1 Juan) medications (semaglutide -OZEMPIC, RYBELSUS, WEGOVY; Dulaglutide- TRULICITY; Liraglutide- SAXENDA; tirzepatide- MOUNJARO) for weight loss or diabetes, MUST be stopped 7 days prior to exam unless discussed with the doctor performing the test.  Weight loss medications such as Phentermine (Adipex/Lomaira) and Diethylpropion (Amfepraone/Tepanil/Tenuate) should be stopped 7 days prior to exam.  You must have a licensed  to bring you home. If using public transportation, you must have an adult come with you.  Do not take any diabetic medications (including insulin) the morning of the exam. If your blood sugar goes below 70, you may drink 2 ounces of clear juice. Wait 15 minutes, then recheck your blood sugar. If it isn't going up, you may drink another 2 ounces of clear juice and contact the On-Call Nurse line at [phone].   Continue to take blood pressure, heart, thyroid, lung, seizure, and psychological medications the morning of procedure.    Do I have to drink all the bowel prep and water?         Yes, in addition to drinking plenty of clear liquids.  "Drinking plenty of clear liquids helps the prep to work and keep you hydrated. Any clear liquid that isn't red or purple. Drink at least 12 (8 oz) glasses of clear liquids or three 32 oz Gatorades by 5PM the day before your procedure. Drink   at least 1 (8 oz) glass of liquid every hour while you're awake. After the first dose of prep, drink an additional four (8 oz) glasses of clear liquids before midnight.  Clear liquids include: Coffee (no cream/milk)  Water  Tea  Clear carbonated drinks (ginger ale, Sprite, or sparkling water)  Gelatin/Jello  Apple, White grape, White Cranberry Juice  Beef/chicken broth/bouillon  Gatorade/Powerade  Lemonade/limeade  Snowballs/slushes  Popsicles  No "Energy" beverages or alcohol. No juices with pulp.  Do not drink red or purple liquids because the dye will stain the walls of your colon and may appear to be a bleed/abnormality.        The first dose of the prep starts to clean out the stool from your colon. The second dose of the prep helps to fully clean out the colon before the procedure.    What are some ways to improve the taste of the prep?  Put the prep solution in the refrigerator to chill before beginning the prep, tastes best cold.  Drinking the prep with a straw.    How will I know that the bowel prep is working? Why is it important to have a good prep or a clean colon before the procedure?  bowel movements are clear or clear yellow.   Colon should be clean as possible so the doctor can see the walls of the colon and find tiny polyps or colon cancer.  If there is stool in the colon, the doctor cannot move the endoscopy scope through the entire colon and the procedure will be canceled.  If a history of poor bowel prep, constipation, opiate medication use, diabetes, or kidney disease, please let us know; you may require an extended bowel prep to clean your colon.  If brown (including dark orange and cloudy) bowel movements on the morning of your procedure, please call the " endoscopy department at [phone] (M-F from 6AM-3PM).      What should I bring to my appointment?  -List of your current medications                                              -Clothing that is easy to put back on after the procedure        -Method of payment        -Your ID         - Insurance card     Leave jewelry and other valuables at home.   Please plan to be at the hospital for 3 - 4 hours.    Why doesn't my appointment time show up in [facility] or [facility]?  [facility] and [facility] are not set up to show surgical times. You will be called the day before the procedure and told your arrival time.    Where is the Endoscopy department located?  [facility] ([facility]) hospital is located at [address], [address] ([address]). Once on [address], [facility] is the second building (Entrance #2) on the left. Check in for your procedure on the 1st floor at Registration.   [facility], is the 2-story surgery center on the left.  The [place] [address] [place] [address]. Many GPS systems are NOT providing accurate instructions, take [address], from either direction, to [address] and proceed to the eastbound service road, turning [facility] the [facility] [place] [facility] Once at the [facility], look for signs directing you to Hospital/Surgery. Check in for your procedure at  for Hospital/Surgery.

## 2025-08-04 NOTE — H&P
Short Stay Endoscopy History and Physical    PCP - Prisca Ferro MD    Procedure - Colonoscopy  ASA - per anesthesia  Mallampati - per anesthesia  History of Anesthesia problems - no  Family history Anesthesia problems -  no     HPI:  This is a 29 y.o. male here for evaluation of :   Active Hospital Problems    Diagnosis  POA    *Rectal bleeding [K62.5]  No    Family history of colon polyps, unspecified [Z83.719]  Not Applicable     Father had colon polyps age 30        Resolved Hospital Problems   No resolved problems to display.         Health Maintenance         Date Due Completion Date    Hepatitis C Screening Never done ---    HIV Screening Never done ---    COVID-19 Vaccine (3 - 2024-25 season) 09/01/2024 6/2/2021    Influenza Vaccine (1) 09/01/2025 12/10/2014    TETANUS VACCINE 11/08/2033 11/8/2023    RSV Vaccine (Age 60+ and Pregnant patients) (1 - 1-dose 75+ series) 12/17/2071 ---              ROS:  CONSTITUTIONAL: Denies weight change,  fatigue, fevers, chills, night sweats.  CARDIOVASCULAR: Denies chest pain, shortness of breath, orthopnea and edema.  RESPIRATORY: Denies cough, hemoptysis, dyspnea, and wheezing.  GI: See HPI.    Medical History:   Past Medical History:   Diagnosis Date    Anxiety        Surgical History:   Past Surgical History:   Procedure Laterality Date    FRACTURE SURGERY      VASECTOMY      01/16/2025       Family History:   Family History   Problem Relation Name Age of Onset    No Known Problems Mother      No Known Problems Father         Social History:   Social History[1]    Allergies:   Review of patient's allergies indicates:  No Known Allergies    Medications:   Medications Ordered Prior to Encounter[2]    Physical Exam:  Vital Signs:   Vitals:    08/04/25 0624   BP: 130/88   Pulse: 103   Resp: 18   Temp: 98.1 °F (36.7 °C)     General Appearance: Well appearing in no acute distress  ENT: OP clear  Chest: CTA B  CV: RRR, no m/r/g  Abd: s/nt/nd/nabs  Ext: no  edema    Labs:Reviewed    IMP:  Active Hospital Problems    Diagnosis  POA    *Rectal bleeding [K62.5]  No    Family history of colon polyps, unspecified [Z83.719]  Not Applicable     Father had colon polyps age 30        Resolved Hospital Problems   No resolved problems to display.         Plan:   I have explained the risks and benefits of colonoscopy to the patient including but not limited to bleeding, perforation, infection, and death. The patient wishes to proceed.         [1]   Social History  Tobacco Use    Smoking status: Never    Smokeless tobacco: Never   [2]   No current outpatient medications on file prior to encounter.     No current facility-administered medications on file prior to encounter.

## 2025-08-04 NOTE — LETTER
Emeka Mendoza  [address] [address]      [facility] Instructions for Colonoscopy    Date of procedure:Monday 8/4/2025  Your arrival time will be 06:30AM  Your procedure will be performed at [facility] ([facility]). The hospital is located at [address] (off [address]).     As soon as possible:     your PRESCRIPTION PREP from pharmacy and over the counter DULCOLAX LAXATIVE TABLETS (bisacodyl 5mg)    Three (3) days before colonoscopy: Avoid high fiber foods such as whole wheat or whole grain breads or pasta, raw vegetables or fruit with peels, corn, beans, peas, lentils, nuts, seeds, and popcorn.    On the day before your procedure     What You CAN do:     You may have CLEAR LIQUIDS ONLY (Drink at least 16 glasses (8oz glass)-   see below for list.   Liquids That Are OK to Drink:    Water    Sports drinks (Gatorade, Power-Aid)    Coffee or tea (no cream or nondairy creamer)    Clear juices without pulp (apple, white grape)    Gelatin desserts (no fruit or toppings)    Clear soda (sprite, coke, ginger ale)    Chicken broth (until 12 midnight the night before procedure)     What You CANNOT do:      Do not EAT solid food, drink milk or anything colored red or purple.    Do not drink alcohol.    Do not take oral medications within 1 hour of starting each dose of prep.    No tobacco products, gum chewing, or candy morning of procedure     PLEASE DISREGARD THE INSERT INSTRUCTIONS FROM THE PHARMACY.  How to take prep:  Day Before Colonoscopy  2:00 pm Take four (4) Dulcolax (Bisacodyl) Laxative tablets with at least 8 ounces or more of clear liquids.    6:00 pm Pour one 6 oz. bottle of prep solution into the mixing container. Add cool water to reach the 16-oz. fill line.  Mix well. Drink ALL the contents in the mixing container. Drink 2 more 16 oz. containers of water. Finish the prep mixture and the 2 glasses of water within 1 hour. Drink a minimum of  four (8 oz) glasses of clear liquids before midnight to stay hydrated. Mix the prep with Crystal Light (no red or purple flavoring), apple juice, or Gatorade (no red or purple) to improve the flavor.     Day of the Colonoscopy     2:00 AM- REPEAT using the second 6 oz prep solution and mixing container. Add cool water to reach the 16-oz. fill line.  Mix well. Drink ALL the contents in the mixing container. Drink 2 more 16 oz. containers of water. Finish the prep mixture and the 2 glasses of water within 1 hour.After finishing prep, do not drink or eat anything until after the colonoscopy.     You may have a small sip of water with your morning medications. If your stool is brown, orange, or cloudy, your colon is not clean, please call endoscopy staff at [phone].    Endoscopy Scheduling Department at [phone].  Endoscopy Department at [phone].   On-Call Nurse line at [phone].  Financial Services at [phone].  Frequently Asked Questions- Colonoscopy  What are some tips for preparing for a colonoscopy?  Stay near a toilet after taking the prep, you will have diarrhea and may have cramping with your bowel movements.  For skin irritation or flaring of hemorrhoids from frequent bowel movements and wiping, ease with over-the-counter products like baby wipes, Vaseline, or Tucks pads.  If experiencing nausea from the prep, take a 30-minute break, rinse your mouth, and continue drinking the prep. Dramamine (Meclizine) is available over the counter, take ½ of a tablet (12.5 mg) every 6 hours as needed for nausea. Do not take more than 50 mg in 24 hours.   If a long drive or need a change to the prep direction times, please call the endoscopy department to talk with our staff at [phone].  Females between the ages of 10-55 may be asked for a urine sample (pregnancy test) after you check in for your procedure. Please let staff know before going to the restroom.  Coumadin (WARFARIN),  Plavix (CLOPIDOGREL), and Effient (PRASUGREL) MUST be stopped 5 days prior to exam unless discussed with the doctor performing the test. Eliquis (APIXABAN), Savaysa (EDOXABAN), Arixtra (FONDAPARINUX), and Xarelto (RIVAROXABAN) MUST be stopped 2 days prior to exam unless discussed with the doctor performing the test.  Glucagon-like peptide-1 receptor agonists (GLP-1 Juan) medications (semaglutide -OZEMPIC, RYBELSUS, WEGOVY; Dulaglutide- TRULICITY; Liraglutide- SAXENDA; tirzepatide- MOUNJARO) for weight loss or diabetes, MUST be stopped 7 days prior to exam unless discussed with the doctor performing the test.  Weight loss medications such as Phentermine (Adipex/Lomaira) and Diethylpropion (Amfepraone/Tepanil/Tenuate) should be stopped 7 days prior to exam.  You must have a licensed  to bring you home. If using public transportation, you must have an adult come with you.  Do not take any diabetic medications (including insulin) the morning of the exam. If your blood sugar goes below 70, you may drink 2 ounces of clear juice. Wait 15 minutes, then recheck your blood sugar. If it isn't going up, you may drink another 2 ounces of clear juice and contact the On-Call Nurse line at [phone].   Continue to take blood pressure, heart, thyroid, lung, seizure, and psychological medications the morning of procedure.    Do I have to drink all the bowel prep and water?             Yes, in addition to drinking plenty of clear liquids. Drinking plenty of clear liquids helps the prep to work and keep you hydrated. Any clear liquid that isn't red or purple. Drink at least 12 (8 oz) glasses of clear liquids or three 32 oz Gatorades by 5PM the day before your procedure. Drink   at least 1 (8 oz) glass of liquid every hour while you're awake. After the first dose of prep, drink an additional four (8 oz) glasses of clear liquids before midnight.  Clear liquids include: Coffee (no cream/milk)  Water  Tea  Clear carbonated drinks  "(ginger ale, Sprite, or sparkling water)  Gelatin/Jello  Apple, White grape, White Cranberry Juice  Beef/chicken broth/bouillon  Gatorade/Powerade  Lemonade/limeade  Snowballs/slushes  Popsicles  No "Energy" beverages or alcohol. No juices with pulp.  Do not drink red or purple liquids because the dye will stain the walls of your colon and may appear to be a bleed/abnormality.        The first dose of the prep starts to clean out the stool from your colon. The second dose of the prep helps to fully clean out the colon before the procedure.    What are some ways to improve the taste of the prep?  Put the prep solution in the refrigerator to chill before beginning the prep, tastes best cold.  Drinking the prep with a straw.    How will I know that the bowel prep is working? Why is it important to have a good prep or a clean colon before the procedure?  bowel movements are clear or clear yellow.   Colon should be clean as possible so the doctor can see the walls of the colon and find tiny polyps or colon cancer.  If there is stool in the colon, the doctor cannot move the endoscopy scope through the entire colon and the procedure will be canceled.  If a history of poor bowel prep, constipation, opiate medication use, diabetes, or kidney disease, please let us know; you may require an extended bowel prep to clean your colon.  If brown (including dark orange and cloudy) bowel movements on the morning of your procedure, please call the endoscopy department at [phone] (M-F from 6AM-3PM).      What should I bring to my appointment?  -List of your current medications                                              -Clothing that is easy to put back on after the procedure        -Method of payment        -Your ID         - Insurance card     Leave jewelry and other valuables at home.   Please plan to be at the hospital for 3 - 4 hours.    Why doesn't my appointment time show up in [employer] or [facility]?  [employer] and [facility] are not set up to show surgical times. You will be called the day before the procedure and told your arrival time.    Where is the Endoscopy department located?  [facility] ([facility]) [facility] is located at [address], [address] ([address]). Once on [address], [facility] is the second building (Entrance #2) on the left. Check in for your procedure on the 1st floor at Registration.   [facility], is the 2-story surgery center on the left.  The [place] [address] [place] [address]. Many GPS systems are NOT providing accurate instructions, take [address], from either direction, to [address] and proceed to the eastbound service road, turning between the [facility] and [facility] Once at the [facility], look for signs directing you to Hospital/Surgery. Check in for your procedure at  for Hospital/Surgery.

## 2025-08-05 VITALS
RESPIRATION RATE: 16 BRPM | DIASTOLIC BLOOD PRESSURE: 72 MMHG | BODY MASS INDEX: 26.05 KG/M2 | HEIGHT: 70 IN | TEMPERATURE: 98 F | HEART RATE: 82 BPM | WEIGHT: 182 LBS | SYSTOLIC BLOOD PRESSURE: 114 MMHG | OXYGEN SATURATION: 97 %

## 2025-08-05 LAB
ESTROGEN SERPL-MCNC: NORMAL PG/ML
INSULIN SERPL-ACNC: NORMAL U[IU]/ML
LAB AP CLINICAL INFORMATION: NORMAL
LAB AP GROSS DESCRIPTION: NORMAL
LAB AP PERFORMING LOCATION(S): NORMAL
LAB AP REPORT FOOTNOTES: NORMAL